# Patient Record
(demographics unavailable — no encounter records)

---

## 2024-10-16 NOTE — HEALTH RISK ASSESSMENT
[Yes] : Yes [2 - 3 times a week (3 pts)] : 2 - 3  times a week (3 points) [1 or 2 (0 pts)] : 1 or 2 (0 points) [Never (0 pts)] : Never (0 points) [No] : In the past 12 months have you used drugs other than those required for medical reasons? No [No falls in past year] : Patient reported no falls in the past year [0] : 2) Feeling down, depressed, or hopeless: Not at all (0) [PHQ-2 Negative - No further assessment needed] : PHQ-2 Negative - No further assessment needed [Never] : Never [HIV test declined] : HIV test declined [Hepatitis C test declined] : Hepatitis C test declined [None] : None [With Family] : lives with family [Employed] : employed [] :  [# Of Children ___] : has [unfilled] children [Sexually Active] : sexually active [Feels Safe at Home] : Feels safe at home [Fully functional (bathing, dressing, toileting, transferring, walking, feeding)] : Fully functional (bathing, dressing, toileting, transferring, walking, feeding) [Fully functional (using the telephone, shopping, preparing meals, housekeeping, doing laundry, using] : Fully functional and needs no help or supervision to perform IADLs (using the telephone, shopping, preparing meals, housekeeping, doing laundry, using transportation, managing medications and managing finances) [With Patient/Caregiver] : , with patient/caregiver [Reviewed no changes] : Reviewed, no changes [I will adhere to the patient's wishes.] : I will adhere to the patient's wishes. [Audit-CScore] : 3 [de-identified] : tries to exercise regularly, trying to get into consistent routine [de-identified] : well balanced and healthy [[id number]] : 0 [Change in mental status noted] : No change in mental status noted [Language] : denies difficulty with language [High Risk Behavior] : no high risk behavior [PapSmearDate] : 2024 [FreeTextEntry2] :

## 2024-10-16 NOTE — HISTORY OF PRESENT ILLNESS
[FreeTextEntry1] : CPE. [de-identified] : Patient is a 39yo female with PMH HLD, anxiety who presents to the office for CPE.    Last CPE:  02/10/2022, NP Melody Johnson.  GYN Exam:  2024, Dr. Koroma. Mammogram:  due for initial, has order from Dr. Koroma, will call for appointment. Colonoscopy:  father hx Farias's and Colon cancer ~age 50; due for initial screening; follows with GI, Dr. Birmingham. Ophthalmology:  does not wear corrective lenses. Dermatology:  due, recommended today. Dentist:  UTD. Flu:  declines today. Tdap:  2020.  COVID:  received. LMP:  end of September; menstruation is fairly regular, not on birth control. STOP BANG negative.  Pt reports discomfort in the throat, ongoing for years, worse with swallowing but feels pretty constant.  mostly on the right side of the throat. pt has had MRI of the cervical spine with pinched nerve in C4/5/6.  Was following with ortho, Dr. Foley.  Ordered for PT but never went, pt interested in going for PT now. Pt has hx thyroid nodule s/p benign bx -- Dr. Broussard (Endocrinology, [facility] in [facility]).  Planning on surveillance yearly (appointment in February) Seen by ENT a while ago for right ear pain/throat pain as well and was diagnosed with silent reflux.  Pt plans to have EGD with Dr. Birmingham.  Pt's mother passed away this summer (ovarian cancer), both in-laws also passed away within the past year. Took a SASCHA from work, feels better and has started back at work. Following with a therapist every 1-2 weeks Pt has been taking Xanax sparingly PRN, has 2 pills left from RX in June and would like RF today to have on hand.

## 2024-10-16 NOTE — ASSESSMENT
[FreeTextEntry1] : Patient is a 39yo female with PMH HLD, anxiety who presents to the office for CPE.  Hansel Maintenance - Due for CRC screening given FHx, will be scheduling with Dr. Birmingham. - Due for initial mammogram, pt plans to schedule in near future (has RX from GYN). - Due for skin check, Dermatology referral provided. - Fasting labs drawn in office. - Pt agreeable to receiving results through [facility] messaging.  Pt advised if they do not hear from the office within the next week, or if they have difficulty opening their [facility] message, they should call the office to review results. - EKG performed in office NSR, no acute ST/T changes, no arrhythmias.  Pt denies cardiac complaints. - Eat plenty of fruits and vegetables, especially deeply colored fruits/vegetables (such as leafy greens, peaches) that are more nutrient-dense.  Continue to work hard on diet and exercise, limiting/avoiding saturated fat, fatty foods, greasy foods, red meats, white flour-based carbohydrates (cookies, cakes, white bread, white rice), and added sugars.  Chose whole grain foods and products made with whole grains over refined grains and white flour-based carbohydrates.  Avoid beverages and food with added sugar.  Limit salt intake to improve blood pressure.  Limit alcohol intake. - Try and incorporate 30 minutes of aerobic exercise to your daily routine.  Anxiety - Takes Xanax sparingly PRN, last RF 6/25/24 from our office, ISTOP reference number [id number]. - RF provided. - Encouraged continued sparing use of medication, current RX should last at least 3 months if not longer. - Continue regular f/u with therapist. - Alert office if symptoms worsen.  Throat pain - Persistent right anterior throat pain without improvement. - Has known thyroid nodules not believed to be causing symptoms. - Will proceed with CT soft tissue neck. - Pt encouraged to continue regular monitoring of thyroid nodules with Endo and follow up with GI for possible EGD (which pt plans to do).  Neck Pain - Chronic neck pain, referred to PT by ortho several months ago but pt never went. - RX for PT provided. - +/- ortho re-evaluation if symptoms persist/worsen.  Call the office or go to the ED immediately if you develop new, worsening or concerning symptoms including high fever, severe headache/worst headache of your life, confusion, dizziness/lightheadedness, loss of consciousness, severe chest pain, difficulty breathing, shortness of breath, severe abdominal pain, excessive vomiting/diarrhea, inability to feel/move the extremities, or any other concerning symptoms. Biopsy Type: H and E

## 2024-10-16 NOTE — REVIEW OF SYSTEMS
[Anxiety] : anxiety [Negative] : Heme/Lymph [Suicidal] : not suicidal [Insomnia] : no insomnia [Depression] : no depression [FreeTextEntry4] : right sided throat pain.